# Patient Record
(demographics unavailable — no encounter records)

---

## 2025-03-12 NOTE — REASON FOR VISIT
[Follow-Up] : a follow-up visit for [Family History] : family history [Patient] : patient [Mother] : mother [FreeTextEntry3] : check mitral valve

## 2025-03-12 NOTE — PHYSICAL EXAM
[General Appearance - Alert] : alert [General Appearance - In No Acute Distress] : in no acute distress [General Appearance - Well Nourished] : well nourished [General Appearance - Well Developed] : well developed [General Appearance - Well-Appearing] : well appearing [Appearance Of Head] : the head was normocephalic [Facies] : there were no dysmorphic facial features [Sclera] : the conjunctiva were normal [Outer Ear] : the ears and nose were normal in appearance [Examination Of The Oral Cavity] : mucous membranes were moist and pink [Auscultation Breath Sounds / Voice Sounds] : breath sounds clear to auscultation bilaterally [Normal Chest Appearance] : the chest was normal in appearance [Apical Impulse] : quiet precordium with normal apical impulse [Heart Rate And Rhythm] : normal heart rate and rhythm [Heart Sounds] : normal S1 and S2 [No Murmur] : no murmurs  [Heart Sounds Gallop] : no gallops [Heart Sounds Pericardial Friction Rub] : no pericardial rub [Heart Sounds Click] : no clicks [Arterial Pulses] : normal upper and lower extremity pulses with no pulse delay [Edema] : no edema [Capillary Refill Test] : normal capillary refill [Bowel Sounds] : normal bowel sounds [Abdomen Soft] : soft [Nondistended] : nondistended [Abdomen Tenderness] : non-tender [Nail Clubbing] : no clubbing  or cyanosis of the fingers [Motor Tone] : normal muscle strength and tone [Cervical Lymph Nodes Enlarged Anterior] : The anterior cervical nodes were normal [Cervical Lymph Nodes Enlarged Posterior] : The posterior cervical nodes were normal [Skin Lesions] : no lesions [Skin Turgor] : normal turgor [Demonstrated Behavior - Infant Nonreactive To Parents] : interactive [Mood] : mood and affect were appropriate for age [Demonstrated Behavior] : normal behavior [Systolic] : systolic [I] : a grade 1/6  [LMSB] : LMSB  [Ejection] : ejection [] : increases when supine [No Diastolic Murmur] : no diastolic murmur was heard

## 2025-03-12 NOTE — HISTORY OF PRESENT ILLNESS
[FreeTextEntry1] : THEO is a 14 year old female  who was brought for cardiology follow up due to family history of cardiac disease. She had mild buckling of the anterior mitral valve leaflet with trivial mitral insufficiency. - She has active and asymptomatic. There has been no chest pain, palpitations, dyspnea, dizziness or syncope. - Daily fluid intake:  Water- > 8 cups, no caffeinated beverages   - field hockey year round, good exercise tolerance  - I reviewed the lab results from 4/6/23: total cholesterol 158 mg/dl; LDL 85 ; HDL  51; triglycerides 121   Family history:  - father - LBBB and low EF dx in 2021. He presented with a new LBBB during a routine medical examination at 47 yo (). He is treated with medication- and EF improved to almost normal. Genetic testing did not reveal a causative gene.  He also has mild high cholesterol, not tx with medication.   - PGF- MI, coronary stents placed, initially in his late 50's - MGF- sudden fatal MI at 44 yo, no history of cardiac disease, occasionally smokes cigars, no other risk factors - mother - normal cholesterol  - sister - elevated LDL, managed with dietary change - sister- elevated LDL,

## 2025-03-12 NOTE — CARDIOLOGY SUMMARY
[Today's Date] : [unfilled] [FreeTextEntry1] : Normal sinus rhythm with sinus arrhythmia and atrial escape rhythm . Atrial and ventricular forces were normal. No ST segment or T-wave abnormality.  QTc 442 [FreeTextEntry2] : 1. Mild buckling of both mitral valve leaflets, it does not prolapse significantly beyond the valve annulus. 2. Trivial mitral valve regurgitation. 3. Trivial tricuspid valve regurgitation. 4. Trivial pulmonary valve regurgitation. 5. Normal left ventricular size, morphology and systolic function. 6. Normal right ventricular morphology with qualitatively normal size and systolic function. 7. No pericardial effusion.

## 2025-03-12 NOTE — DISCUSSION/SUMMARY
[PE + No Restrictions] : [unfilled] may participate in the entire physical education program without restriction, including all varsity competitive sports. [FreeTextEntry1] : - In summary, Toribio has a family history of cardiac disease. Based on the description of her father having left bundle branch block and decreased ejection fraction, he likely has a cardiomyopathy without a clear etiology  - Toribio's ECG is normal. Her echocardiogram shows no evidence of cardiomyopathy at this time.  - There is mild buckling of the mitral valve leaflets, but they do not prolapse beyond the MV annulus. There is trivial mitral insufficiency. This may represent an early transition toward mitral valve prolapse in the future. The MV may have this appearance when individuals are relatively volume depleted.  - I would like to reevaluate her in 2 yrs or sooner if there are any further cardiac concerns. - The family verbalized understanding, and all questions were answered.  [Needs SBE Prophylaxis] : [unfilled] does not need bacterial endocarditis prophylaxis

## 2025-03-12 NOTE — HISTORY OF PRESENT ILLNESS
[FreeTextEntry1] : THEO is a 14 year old female  who was brought for cardiology follow up due to family history of cardiac disease. She had mild buckling of the anterior mitral valve leaflet with trivial mitral insufficiency. - She has active and asymptomatic. There has been no chest pain, palpitations, dyspnea, dizziness or syncope. - Daily fluid intake:  Water- > 8 cups, no caffeinated beverages   - field hockey year round, good exercise tolerance  - I reviewed the lab results from 4/6/23: total cholesterol 158 mg/dl; LDL 85 ; HDL  51; triglycerides 121   Family history:  - father - LBBB and low EF dx in 2021. He presented with a new LBBB during a routine medical examination at 45 yo (). He is treated with medication- and EF improved to almost normal. Genetic testing did not reveal a causative gene.  He also has mild high cholesterol, not tx with medication.   - PGF- MI, coronary stents placed, initially in his late 50's - MGF- sudden fatal MI at 44 yo, no history of cardiac disease, occasionally smokes cigars, no other risk factors - mother - normal cholesterol  - sister - elevated LDL, managed with dietary change - sister- elevated LDL,

## 2025-03-12 NOTE — CONSULT LETTER
[Today's Date] : [unfilled] [Name] : Name: [unfilled] [] : : ~~ [Today's Date:] : [unfilled] [Dear  ___:] : Dear Dr. [unfilled]: [Consult] : I had the pleasure of evaluating your patient, [unfilled]. My full evaluation follows. [Consult - Single Provider] : Thank you very much for allowing me to participate in the care of this patient. If you have any questions, please do not hesitate to contact me. [Sincerely,] : Sincerely, [FreeTextEntry4] : Dr. Jadyn Carver MD [FreeTextEntry5] : [address] [address] [FreeTextEntry6] : [place] [address] [de-identified] : Nisha Watkins MD, FACC, FASCE, FAAP\par  Pediatric Cardiologist\par  [facility]\par

## 2025-03-12 NOTE — CONSULT LETTER
[Today's Date] : [unfilled] [Name] : Name: [unfilled] [] : : ~~ [Today's Date:] : [unfilled] [Dear  ___:] : Dear Dr. [unfilled]: [Consult] : I had the pleasure of evaluating your patient, [unfilled]. My full evaluation follows. [Consult - Single Provider] : Thank you very much for allowing me to participate in the care of this patient. If you have any questions, please do not hesitate to contact me. [Sincerely,] : Sincerely, [FreeTextEntry4] : Dr. Jadyn Carver MD [FreeTextEntry5] : [address] [address] [FreeTextEntry6] : [place] [address] [de-identified] : Nisha Watkins MD, FACC, FASCE, FAAP\par  Pediatric Cardiologist\par  [facility]\par

## 2025-06-13 NOTE — PHYSICAL EXAM

## 2025-06-14 NOTE — HISTORY OF PRESENT ILLNESS
[Mother] : mother [Yes] : Patient goes to dentist yearly [Toothpaste] : Primary Fluoride Source: Toothpaste [Up to date] : Up to date [Normal] : normal [LMP: _____] : LMP: [unfilled] [Cycle Length: _____ days] : Cycle Length: [unfilled] days [Days of Bleeding: _____] : Days of bleeding: [unfilled] [Eats meals with family] : eats meals with family [Has family members/adults to turn to for help] : has family members/adults to turn to for help [Is permitted and is able to make independent decisions] : Is permitted and is able to make independent decisions [Sleep Concerns] : sleep concerns [Grade: ____] : Grade: [unfilled] [Normal Performance] : normal performance [Normal Behavior/Attention] : normal behavior/attention [Normal Homework] : normal homework [Eats regular meals including adequate fruits and vegetables] : eats regular meals including adequate fruits and vegetables [Drinks non-sweetened liquids] : drinks non-sweetened liquids  [Calcium source] : calcium source [Has friends] : has friends [At least 1 hour of physical activity a day] : at least 1 hour of physical activity a day [Uses safety belts/safety equipment] : uses safety belts/safety equipment  [Has peer relationships free of violence] : has peer relationships free of violence [No] : Patient has not had sexual intercourse. [Has ways to cope with stress] : has ways to cope with stress [Displays self-confidence] : displays self-confidence [With Teen] : teen [NO] : No [Irregular menses] : no irregular menses [Has concerns about body or appearance] : does not have concerns about body or appearance [Screen time (except homework) less than 2 hours a day] : no screen time (except homework) less than 2 hours a day [Uses electronic nicotine delivery system] : does not use electronic nicotine delivery system [Exposure to electronic nicotine delivery system] : no exposure to electronic nicotine delivery system [Uses tobacco] : does not use tobacco [Exposure to tobacco] : no exposure to tobacco [Uses drugs] : does not use drugs  [Exposure to drugs] : no exposure to drugs [Drinks alcohol] : does not drink alcohol [Exposure to alcohol] : no exposure to alcohol [Has problems with sleep] : does not have problems with sleep [Gets depressed, anxious, or irritable/has mood swings] : does not get depressed, anxious, or irritable/has mood swings [Has thought about hurting self or considered suicide] : has not thought about hurting self or considered suicide [FreeTextEntry7] : 15 YR [facility]- doing well Followed by cardiology for family history of cardiac disease, father with low EF, last visit 3/25, no evidence of cardiomyopathy on echo- follow up in 2 years Saw orthopedist for left knee injury playing flag football-diagnosed with bone bruise- in PT- still needs to get clearance for full activity Followed by ophthalmology- wears glasses for distance [de-identified] : none [de-identified] : flag football, field hockey

## 2025-06-14 NOTE — DISCUSSION/SUMMARY
[Normal Growth] : growth [Normal Development] : development  [No Elimination Concerns] : elimination [Continue Regimen] : feeding [No Skin Concerns] : skin [Normal Sleep Pattern] : sleep [None] : no medical problems [Anticipatory Guidance Given] : Anticipatory guidance addressed as per the history of present illness section [Physical Growth and Development] : physical growth and development [Social and Academic Competence] : social and academic competence [Emotional Well-Being] : emotional well-being [Risk Reduction] : risk reduction [Violence and Injury Prevention] : violence and injury prevention [No Vaccines] : no vaccines needed [No Medications] : ~He/She~ is not on any medications [Mother] : mother [Parent/Guardian] : Parent/Guardian [Full Activity without restrictions including Physical Education & Athletics] : Full Activity without restrictions including Physical Education & Athletics [FreeTextEntry1] : Continue balanced diet with all food groups. Brush teeth twice a day with toothbrush. Recommend visit to dentist. Maintain consistent daily routines and sleep schedule. Personal hygiene and sexual health reviewed. Risky behaviors assessed. School discussed. Limit screen time to no more than 2 hours per day. Encourage physical activity.   Vaccines UTD- HPV discussed and declined   Coordination of care reviewed   CRAJACT reviewed.   PHQ9 reviewed.   Cardiac reviewed- no increased risk for SCD   5-2-1-0 reviewed  Passed vision and hearing screenings  Return 1 year for routine well child check or sooner if any concerns

## 2025-06-14 NOTE — RISK ASSESSMENT
[No Increased risk of SCA or SCD] : No Increased risk of SCA or SCD    [Has anyone in your immediate family (parents, grandparents, siblings) or other more distant relatives (aunts, uncles, cousins)  of heart] : Has anyone in your immediate family (parents, grandparents, siblings) or other more distant relatives (aunts, uncles, cousins)  of heart problems or had an unexpected sudden death before age 50 (This would include unexpected drownings, unexplained car accidents in which the relative was driving or sudden infant death syndrome.)? Yes [Have you ever fainted, passed out or had an unexplained seizure suddenly and without warning, especially during exercise or in response] : Have you ever fainted, passed out or had an unexplained seizure suddenly and without warning, especially during exercise or in response to sudden loud noises such as doorbells, alarm clocks and ringing telephones? No [Have you ever had exercise-related chest pain or shortness of breath?] : Have you ever had exercise-related chest pain or shortness of breath? No [Are you related to anyone with hypertrophic cardiomyopathy or hypertrophic obstructive cardiomyopathy, Marfan syndrome, arrhythmogenic] : Are you related to anyone with hypertrophic cardiomyopathy or hypertrophic obstructive cardiomyopathy, Marfan syndrome, arrhythmogenic right ventricular cardiomyopathy, long QT syndrome, short QT syndrome, Brugada syndrome or catecholaminergic polymorphic ventricular tachycardia, or anyone younger than 50 years with a pacemaker or implantable defibrillator? No

## 2025-06-14 NOTE — HISTORY OF PRESENT ILLNESS
[Mother] : mother [Yes] : Patient goes to dentist yearly [Toothpaste] : Primary Fluoride Source: Toothpaste [Up to date] : Up to date [Normal] : normal [LMP: _____] : LMP: [unfilled] [Cycle Length: _____ days] : Cycle Length: [unfilled] days [Days of Bleeding: _____] : Days of bleeding: [unfilled] [Eats meals with family] : eats meals with family [Has family members/adults to turn to for help] : has family members/adults to turn to for help [Is permitted and is able to make independent decisions] : Is permitted and is able to make independent decisions [Sleep Concerns] : sleep concerns [Grade: ____] : Grade: [unfilled] [Normal Performance] : normal performance [Normal Behavior/Attention] : normal behavior/attention [Normal Homework] : normal homework [Eats regular meals including adequate fruits and vegetables] : eats regular meals including adequate fruits and vegetables [Drinks non-sweetened liquids] : drinks non-sweetened liquids  [Calcium source] : calcium source [Has friends] : has friends [At least 1 hour of physical activity a day] : at least 1 hour of physical activity a day [Uses safety belts/safety equipment] : uses safety belts/safety equipment  [Has peer relationships free of violence] : has peer relationships free of violence [No] : Patient has not had sexual intercourse. [Has ways to cope with stress] : has ways to cope with stress [Displays self-confidence] : displays self-confidence [With Teen] : teen [NO] : No [Irregular menses] : no irregular menses [Has concerns about body or appearance] : does not have concerns about body or appearance [Screen time (except homework) less than 2 hours a day] : no screen time (except homework) less than 2 hours a day [Uses electronic nicotine delivery system] : does not use electronic nicotine delivery system [Exposure to electronic nicotine delivery system] : no exposure to electronic nicotine delivery system [Uses tobacco] : does not use tobacco [Exposure to tobacco] : no exposure to tobacco [Uses drugs] : does not use drugs  [Exposure to drugs] : no exposure to drugs [Drinks alcohol] : does not drink alcohol [Exposure to alcohol] : no exposure to alcohol [Has problems with sleep] : does not have problems with sleep [Gets depressed, anxious, or irritable/has mood swings] : does not get depressed, anxious, or irritable/has mood swings [Has thought about hurting self or considered suicide] : has not thought about hurting self or considered suicide [FreeTextEntry7] : 15 YR [facility]- doing well Followed by cardiology for family history of cardiac disease, father with low EF, last visit 3/25, no evidence of cardiomyopathy on echo- follow up in 2 years Saw orthopedist for left knee injury playing flag football-diagnosed with bone bruise- in PT- still needs to get clearance for full activity Followed by ophthalmology- wears glasses for distance [de-identified] : none [de-identified] : flag football, field hockey

## 2025-06-30 NOTE — DISCUSSION/SUMMARY
[FreeTextEntry1] : Discussed with mother ear lobe cyst Recommend seeing plastic surgeon or general surgeon for treatment

## 2025-06-30 NOTE — HISTORY OF PRESENT ILLNESS
[de-identified] : pt states bump on back of right ear around ear piercing [FreeTextEntry6] : Lump on back of ear lobe at ear piercing site x 1 week Noticed when she went to clean her earrings No tenderness or pain, no discharge, no redness Afebrile

## 2025-06-30 NOTE — PHYSICAL EXAM
[NL] : no acute distress, alert [de-identified] : pea sized flesh colored papule on back of right ear lobe, no edema or erythema, nontender and mobile